# Patient Record
Sex: FEMALE
[De-identification: names, ages, dates, MRNs, and addresses within clinical notes are randomized per-mention and may not be internally consistent; named-entity substitution may affect disease eponyms.]

---

## 2017-06-18 ENCOUNTER — HOSPITAL ENCOUNTER (EMERGENCY)
Dept: HOSPITAL 68 - ERH | Age: 57
End: 2017-06-18
Payer: COMMERCIAL

## 2017-06-18 VITALS — BODY MASS INDEX: 23.56 KG/M2 | HEIGHT: 64 IN | WEIGHT: 138 LBS

## 2017-06-18 VITALS — SYSTOLIC BLOOD PRESSURE: 134 MMHG | DIASTOLIC BLOOD PRESSURE: 82 MMHG

## 2017-06-18 DIAGNOSIS — H11.31: Primary | ICD-10-CM

## 2017-06-18 NOTE — ED EYE COMPLAINT
History of Present Illness
 
General
Chief Complaint: Eye Problems
Stated Complaint: "PER PT RT EYE CUT"
Source: patient, old records
Exam Limitations: no limitations
 
Vital Signs & Intake/Output
Vital Signs & Intake/Output
 Vital Signs
 
 
Date Time Temp Pulse Resp B/P B/P Pulse O2 O2 Flow FiO2
 
     Mean Ox Delivery Rate 
 
06/18 0135 98.1 99 17 134/82  98 Room Air  
 
06/18 0026 98.4 102 16 137/84  98 Room Air Room Air 
 
 
Allergies
Uncoded Allergies:
METAL (Mild, RASH 09/21/16)
SOAPS AND LOTIONS (Mild, RASH 09/21/16)
 
Reconcile Medications
Cyclobenzaprine HCl 10 MG TABLET   1 TAB PO TID FIBRO/RA  (Reported)
Duloxetine HCl 30 MG CAPSULE.DR   1 CAP PO DAILY FIBROMYALGIA  (Reported)
Escitalopram Oxalate 20 MG TABLET   1 TAB PO DAILY FIBROMYALGIA  (Reported)
Fentanyl 1 EACH PATCH.TD72   1 PAT TOP Q3D FIBROMAYLGIA/RA  (Reported)
Gabapentin (Neurontin) 800 MG TABLET   1 TAB PO TID FIBROMAYLGIA/RA  (Reported)
Ibuprofen 600 MG TABLET   1 TAB PO TID PRN CHEST WALL PAIN
     with food
Infliximab (Remicade) 100 MG VIAL   (Unknown Dose) IV/IM Q 3-4 HRS PRN PRN RA  (
Reported)
Levothyroxine Sodium 75 MCG TABLET   1 TAB PO DAILY AC THYROID  (Reported)
Lisinopril 10 MG TABLET   1 TAB PO DAILY AS NEEDED B/P  (Reported)
Oxycodone HCl/Acetaminophen (Percocet 5-325 MG Tablet) 5 MG-325 MG TABLET   1-2 
TAB PO Q6P PRN PAIN
Prednisone 10 MG TABLET   2 TAB PO BID RA  (Reported)
TRAMADOL HCL (Tramadol Hydrochloride) 50 MG TABLET   1 TAB PO 4 TIMES/DAY PRN 
PAIN  (Reported)
 
Triage Note:
55yo FEMALE TO TRIAGE W/CO R EYE REDNESS.
 STATES SHE "WAS WORKING IN THE [place]ARD[place] AND MAY
 HAVE BEEN SCRATCHED BY A BRANCH OR SOMETHING"
Triage Nurses Notes Reviewed? yes
Onset: yesterday
Duration: day(s):, constant, continues in ED
Timing: recent history
Injury Environment: neighbor's
Severity: mild
No Modifying Factors: none
Right Eye Associated Symptoms: foreign body sensation
LMP (ages 10-50): post menopausal
Pregnant: No
Patient currently breastfeeds: No
HPI:
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
home and thinks she may have scratched her right eye.  She complains of redness 
and foreign body sensation.
She denies change in vision fever chills nausea vomiting diarrhea abdominal pain
chest pain shortness breath headache dysuria rash.
 
Past History
 
Travel History
Traveled to [place] past 21 day No
 
Medical History
Any Pertinent Medical History? see below for history
Neurological: NONE
EENT: NONE
Cardiovascular: hypertension
Respiratory: NONE
Gastrointestinal: NONE
Hepatic: NONE
Renal: NONE
Musculoskeletal: fibromyalgia, ARTHRITIS
Psychiatric: NONE
Endocrine: hypothyroidism
Blood Disorders: NONE
Cancer(s): NONE
GYN/Reproductive: NONE
 
Surgical History
Surgical History: non-contributory
 
Psychosocial History
What is your primary language English
Tobacco Use: Quit >30 days ago
 
Family History
Hx Contributory? No
 
Review of Systems
 
Review of Systems
Constitutional:
Reports: no symptoms. 
Eyes:
Reports: see HPI, foreign body sensation. 
Ear:
Reports: no symptoms. 
Nose:
Reports: no symptoms. 
Mouth:
Reports: no symptoms. 
Throat:
Reports: no symptoms. 
Respiratory:
Reports: no symptoms. 
Cardiovascular:
Reports: no symptoms. 
GI:
Reports: no symptoms. 
Genitourinary:
Reports: no symptoms. 
Musculoskeletal:
Reports: no symptoms. 
Skin:
Reports: no symptoms. 
Neurological/Psychological:
Reports: no symptoms. 
Hematologic/Endocrine:
Reports: no symptoms. 
Immunologic/Allergic:
Reports: no symptoms. 
All Other Systems: Reviewed and Negative
 
Physical Exam
General Appearance: well developed/nourished, mild distress
General Inspection: normal inspection
Eyelid: normal inspection
Conjunctiva/Sclera: normal inspection
Cornea: normal inspection
EOM: intact
Pupil: normal accommodation, normal pupil, PERRL
Anterior Chamber: normal inspection
General Inspection: normal inspection
Eyelid: normal inspection, everted for exam
Conjunctiva/Sclera: subconjunctival hemorrhag
Cornea: normal inspection, examined w/fluorescein
EOM: intact
Pupil: normal accommodation, normal pupil, PERRL
Anterior Chamber: normal inspection
 
Physical Exam
Head: atraumatic
Ears:
Bilateral: canal normal, Tympanic normal. 
Nose: normal inspection
Mouth/Throat: normal mouth inspection
Neck: normal inspection, supple
Cardiovascular/Respiratory: normal breath sounds, regular rate/rhythm
Neurologic/Psych: awake, alert, oriented x 3, normal mood/affect
Skin: intact, normal color, warm/dry
 
Progress
Differential Diagnosis: corneal abrasion, corneal foreign body, conjunctivitis
Plan of Care:
ophthalmology follow up
 
Departure
 
Departure
Time of Disposition: 0131
Disposition: HOME OR SELF CARE
Condition: Stable
Clinical Impression
Primary Impression: Subconjunctival hemorrhage of right eye
Referrals:
AMANDA SALDIVAR,ELIOT MONTOYA (PCP/Family)
 
CORAL SALDIVAR,LIBERTAD BOO
Call for ophthalmology follow up
 
Departure Forms:
Customer Survey
General Discharge Information

## 2018-05-06 ENCOUNTER — HOSPITAL ENCOUNTER (EMERGENCY)
Dept: HOSPITAL 68 - ERH | Age: 58
End: 2018-05-06
Payer: COMMERCIAL

## 2018-05-06 VITALS — HEIGHT: 64 IN | BODY MASS INDEX: 23.9 KG/M2 | WEIGHT: 140 LBS

## 2018-05-06 VITALS — SYSTOLIC BLOOD PRESSURE: 153 MMHG | DIASTOLIC BLOOD PRESSURE: 95 MMHG

## 2018-05-06 DIAGNOSIS — M54.5: Primary | ICD-10-CM

## 2018-05-06 NOTE — ED NECK/BACK PAIN COMPLAINT
History of Present Illness
 
General
Chief Complaint: Low Back Pain/Injury
Stated Complaint: "BACK PAIN, CANT WALK"
Source: patient
Exam Limitations: no limitations
 
Vital Signs & Intake/Output
Vital Signs & Intake/Output
 Vital Signs
 
 
Date Time Temp Pulse Resp B/P B/P Pulse O2 O2 Flow FiO2
 
     Mean Ox Delivery Rate 
 
05/06 0135 96.1 103 16 153/95  95 Room Air Room Air 
 
 
 
Allergies
Uncoded Allergies:
METAL (Mild, RASH 09/21/16)
SOAPS AND LOTIONS (Mild, RASH 09/21/16)
 
Reconcile Medications
Cyclobenzaprine HCl 5 MG TABLET   1 TAB PO TIDPRN muscle spasm  (Reported)
Docusate Sodium (Colace) 100 MG CAPSULE   1 CAP PO BID CONSTIPATION
Duloxetine HCl 30 MG CAPSULE.DR   1 CAP PO DAILY FIBROMYALGIA  (Reported)
Escitalopram Oxalate 20 MG TABLET   1 TAB PO DAILY FIBROMYALGIA  (Reported)
Fentanyl 1 EACH PATCH.TD72   1 PAT TOP Q3D FIBROMAYLGIA/RA  (Reported)
Gabapentin (Neurontin) 800 MG TABLET   1 TAB PO TID FIBROMAYLGIA/RA  (Reported)
Infliximab (Remicade) 100 MG VIAL   (Unknown Dose) IV/IM Q 3-4 HRS PRN PRN RA  (
Reported)
Levothyroxine Sodium (Levoxyl) 75 MCG TABLET   1 TAB PO DAILY AC THYROID  (
Reported)
Lisinopril 10 MG TABLET   1 TAB PO DAILY AS NEEDED B/P  (Reported)
Omeprazole 20 MG CAPSULE.DR   1 CAP PO DAILY hiatal hernia  (Reported)
Oxycodone HCl/Acetaminophen (Percocet 5-325 MG Tablet) 5 MG-325 MG TABLET   1-2 
TAB PO Q4-6 PRN PAIN
Prednisone 10 MG TABLET   2 TAB PO BID RA  (Reported)
Tramadol HCl 50 MG TABLET   1 TAB PO 4 TIMES/DAY PRN PAIN  (Reported)
Tramadol HCl (Ultram) 50 MG TABLET   1-2 TAB PO TID PRN PAIN
     TWENTY...[id number]
 
Triage Note:
58YO FEMALE TO TRIAGE W/CO LOW BACK PAIN SINCE
 FRI. DENIES ANY FALL OR TRAUMA.  STATES PAIN
 WORSENS W/SITTING AND STANDING. STATES SHE TOOK
 TORADOL AND GOT MILD RELIEF.
Triage Nurses Notes Reviewed? yes
Onset: Gradual
Duration: day(s):
Timing: recent history
Quality/Severity: moderate
Location: lumbar spine
Radiation: upper legs
Context: "IT felt worse friday morning but got better with tramadol
Associated Symptoms: muscle spasm
HPI:
58 yo woman
presents with 2 days of lower lumbar back pain w/ muscle spasm and mild numbness
in left anterior thigh, without weakness.
 
No bowel or bladder issues.  She took tramadol x 3 tabs with improvement in her 
pain.  "I was crying in pain but the tramadol helped a lot."
 
She is otherwise well. 
 
Past History
 
Travel History
Traveled to [place] past 21 day No
 
Medical History
Any Pertinent Medical History? see below for history
Neurological: NONE
EENT: NONE
Cardiovascular: hypertension
Respiratory: NONE
Gastrointestinal: hiatal hernia
Hepatic: NONE
Renal: NONE
Musculoskeletal: fibromyalgia, RA
Psychiatric: NONE
Endocrine: hypothyroidism
Blood Disorders: anemia
Cancer(s): NONE
GYN/Reproductive: ENDOMETRIOSIS
History of MRSA: No
History of VRE: No
History of CDIFF: No
 
Surgical History
Surgical History: laparoscopic lysis adhesions
 
Psychosocial History
What is your primary language English
Tobacco Use: Never used
 
Family History
Hx Contributory? No
 
Review of Systems
 
Review of Systems
Constitutional:
Reports: no symptoms. 
Eyes:
Reports: no symptoms. 
Ears, Nose, Throat, Mouth:
Reports: no symptoms. 
Respiratory:
Reports: no symptoms. 
Cardiovascular:
Reports: no symptoms. 
Gastrointestinal/Abdominal:
Reports: no symptoms. 
Musculoskeletal:
Reports: no symptoms. 
Skin:
Reports: no symptoms. 
Neurological/Psychological:
Reports: no symptoms. 
All Other Systems: Reviewed and Negative
 
Physical Exam
 
Physical Exam
General Appearance: well developed/nourished, mild distress
Head: atraumatic
Eyes:
Bilateral: normal appearance. 
Ears, Nose, Throat, Mouth: hearing grossly normal, moist mucous membrane
Neck: normal inspection, supple, full range of motion
Respiratory: normal breath sounds, chest non-tender, no respiratory distress, 
quiet respiration, lungs clear
Cardiovascular: regular rate/rhythm
Gastrointestinal: normal bowel sounds, soft, non-tender
Back: normal inspection, muscle spasm, no vertebral tenderness
Extremities: non-tender, normal range of motion, no ligament instability
Straight Leg Raising:
Right: Negative.  Left: Negative. 
Comments:
slight decrease in sensation in left anterior thigh
 
strength, dtr's intact in bilateral lower extremities. 
 
Core Measures
CVA/TIA Diagnosis: No
 
Progress
Differential Diagnosis: musculoskeletal back pain, radiculopathy vs other. 
Plan of Care:
 Current Medications
 
 
  Sig/Liz Start time  Last
 
Medication Dose  Stop Time Status Admin
 
Ketorolac  60 MG ONCE ONE 05/06 0215 UNVr 05/06
 
Tromethamine   05/06 0216  0206
 
(Toradol)     
 
 
 
 
Departure
 
Departure
Disposition: HOME OR SELF CARE
Condition: Stable
Clinical Impression
Primary Impression: Back pain
Referrals:
Deanna SALDIVAR,Aroldo MONTOYA (PCP/Family)
 
Departure Forms:
Customer Survey
General Discharge Information
Prescriptions:
Current Visit Scripts
Tramadol HCl (Ultram) 1-2 TAB PO TID PRN PAIN 
     #20 TAB 
     TWENTY...[id number]
 
 
Comments
pt given toradol IM w/ rx for tramadol... pt has appointment with pain 
management this monday.

## 2018-07-08 ENCOUNTER — HOSPITAL ENCOUNTER (EMERGENCY)
Dept: HOSPITAL 68 - ERH | Age: 58
End: 2018-07-08
Payer: COMMERCIAL

## 2018-07-08 VITALS — WEIGHT: 138 LBS | HEIGHT: 64 IN | BODY MASS INDEX: 23.56 KG/M2

## 2018-07-08 VITALS — SYSTOLIC BLOOD PRESSURE: 146 MMHG | DIASTOLIC BLOOD PRESSURE: 85 MMHG

## 2018-07-08 DIAGNOSIS — M54.5: ICD-10-CM

## 2018-07-08 DIAGNOSIS — R20.2: Primary | ICD-10-CM

## 2018-07-08 NOTE — ED NECK/BACK PAIN COMPLAINT
History of Present Illness
 
General
Chief Complaint: Low Back Pain/Injury
Stated Complaint: BIBA BACK PAIN
Source: patient, old records
Exam Limitations: no limitations
 
Vital Signs & Intake/Output
Vital Signs & Intake/Output
 Vital Signs
 
 
Date Time Temp Pulse Resp B/P B/P Pulse O2 O2 Flow FiO2
 
     Mean Ox Delivery Rate 
 
07/08 1124 98.2 114 16 146/85  98 Room Air  
 
 
 
Allergies
Uncoded Allergies:
METAL (Mild, RASH 09/21/16)
SOAPS AND LOTIONS (Mild, RASH 09/21/16)
 
Triage Note:
PT BIBA C/C LOW BACK PAIN UPON WAKENING THIS AM.
HX DJD, SEES PAIN MANAGEMENT SPECIALIST IN
[place]. PT USES FENTANYL PATCH, NAPROXEN, AND
GABAPENTIN FOR SAME. MEDICATED WITH 100 MG
FENTANYL IV BY PARAMEDIC EN ROUTE. UPON ARRIVAL
PAIN 6/10. PT STATES USED LIDODERM PATCH WITH
RELIEF PREVIOUSLY. DENIES RECENT INJURY OR TRAUMA
TO EXACERBATE CHRONIC PAIN.
Triage Nurses Notes Reviewed? yes
Onset: Gradual
Duration: week(s):
Timing: recent history
Location: lumbar spine
Radiation: upper legs
HPI:
57-year-old female with history of chronic back pain, fibromyalgia brought in by
ambulance complaining of low back pain worsening this morning.  Patient states 
that she saw her specialist this week, they prescribed her tramadol and that no 
patch however her pain worsened this morning.  Patient was medicated with IV 
fentanyl in route to hospital.  Patient reports back pain radiates from left low
back down left leg with associated numbness of left thigh.  There is no trauma 
or inciting that prior to onset of pain.  Patient denies saddle anesthesia, 
urinary or bowel incontinence, fevers, chills, vomiting.
(Shaila DIAMOND,Doris Cox)
Reconcile Medications
Cyclobenzaprine HCl 5 MG TABLET   1 TAB PO TIDPRN muscle spasm  (Reported)
Docusate Sodium (Colace) 100 MG CAPSULE   1 CAP PO BID CONSTIPATION
Duloxetine HCl 30 MG CAPSULE.DR   1 CAP PO DAILY FIBROMYALGIA  (Reported)
Escitalopram Oxalate 20 MG TABLET   1 TAB PO DAILY FIBROMYALGIA  (Reported)
Fentanyl 1 EACH PATCH.TD72   1 PAT TOP Q3D FIBROMAYLGIA/RA  (Reported)
Gabapentin (Neurontin) 800 MG TABLET   1 TAB PO TID FIBROMAYLGIA/RA  (Reported)
Infliximab (Remicade) 100 MG VIAL   (Unknown Dose) IV/IM Q 3-4 HRS PRN PRN RA  (
Reported)
Levothyroxine Sodium (Levoxyl) 75 MCG TABLET   1 TAB PO DAILY AC THYROID  (
Reported)
Lidocaine (Lidoderm) 5 % ADH..PATCH   1 PAT TOP DAILY PRN back pain
     may wear up to 12 hours
Lidocaine (Lidoderm) 5 % ADH..PATCH   1 PAT TOP DAILY PRN pain
     may wear up to 12 hours
Lisinopril 10 MG TABLET   1 TAB PO DAILY AS NEEDED B/P  (Reported)
Meloxicam (Mobic) 15 MG TABLET   1 TAB PO DAILY PRN pain
Methylprednisolone. (Medrol) 4 MG TAB.DS.PK   1 DP PO AD radiculopathy
     6 on day 1 then reduce by one tablet daily until gone
Naproxen (Naprosyn) 500 MG TABLET   1 TAB PO BID PRN back pain
Omeprazole 20 MG CAPSULE.DR   1 CAP PO DAILY hiatal hernia  (Reported)
Oxycodone HCl/Acetaminophen (Percocet 5-325 MG Tablet) 5 MG-325 MG TABLET   1-2 
TAB PO Q4-6 PRN PAIN
Prednisone 10 MG TABLET   2 TAB PO BID RA  (Reported)
Tramadol HCl 50 MG TABLET   1 TAB PO 4 TIMES/DAY PRN PAIN  (Reported)
Tramadol HCl (Ultram) 50 MG TABLET   1-2 TAB PO TID PRN PAIN
     TWENTY...[id number]
 
(Kaelyn SALDIVAR,Marcial)
 
Past History
 
Travel History
Traveled to [place] past 21 day No
 
Medical History
Any Pertinent Medical History? see below for history
Neurological: NONE
EENT: NONE
Cardiovascular: hypertension
Respiratory: NONE
Gastrointestinal: hiatal hernia
Hepatic: NONE
Renal: NONE
Musculoskeletal: degen joint disease, fibromyalgia, RA
Psychiatric: NONE
Endocrine: hypothyroidism
Blood Disorders: anemia
Cancer(s): NONE
GYN/Reproductive: ENDOMETRIOSIS
History of MRSA: No
History of VRE: No
History of CDIFF: No
 
Surgical History
Surgical History: laparoscopic lysis adhesions
 
Psychosocial History
What is your primary language English
Tobacco Use: Never used
 
Family History
Hx Contributory? No
(Doris Perdue)
 
Review of Systems
 
Review of Systems
Constitutional:
Reports: no symptoms. 
Eyes:
Reports: no symptoms. 
Ears, Nose, Throat, Mouth:
Reports: no symptoms. 
Respiratory:
Reports: no symptoms. 
Cardiovascular:
Reports: no symptoms. 
Gastrointestinal/Abdominal:
Reports: no symptoms. 
Musculoskeletal:
Reports: see HPI. 
Skin:
Reports: no symptoms. 
Neurological/Psychological:
Reports: see HPI. 
All Other Systems: Reviewed and Negative
(Doris Perdue)
 
Physical Exam
 
Physical Exam
General Appearance: well developed/nourished, no apparent distress, alert, awake
Head: atraumatic, normal appearance
Eyes:
Bilateral: normal appearance. 
Ears, Nose, Throat, Mouth: hearing grossly normal
Neck: normal inspection, supple, full range of motion
Respiratory: no respiratory distress
Peripheral Pulses:
2+ dorsalis pedis (R), 2+ dorsalis pedis (L)
Back: Lumbar tenderness without deformity
Extremities: left buttocks tenderness, full range of motion
Neurologic/Psych: no motor/sensory deficits, awake, alert, oriented x 3, normal 
gait, strength 5/5 equal bilateral lower extremities
Skin: intact, normal color, warm/dry
 
Core Measures
CVA/TIA Diagnosis: No
(Doris Perdue)
 
Progress
Differential Diagnosis: cauda equina syn, herniated disc, myofascial strain, 
sciatica, spinal cord inj, T/L spine injury, spinal abscess
Plan of Care:
 Current Medications
 
 
  Sig/Liz Start time  Last
 
Medication Dose  Stop Time Status Admin
 
Lidocaine 1 PAT ONCE ONE 07/08 1215 UNVr 
 
(Lidoderm)   07/08 1216  
 
 
The patient has no urinary/bowel incontinence, no saddle anesthesia, the 
diagnosis of cauda equina syndrome is unlikely in this patient.  She has no 
fevers, appears nontoxic appearing, listless suspicion for abscess.  Patient's 
pain has been present for over one year, pain is in similar location as previous
episodes of her exacerbated back pain.  Patient is ambulatory here in the 
emergency department.  According to CT , patient has outstanding prescription
for fentanyl patch.  She is also a patient of pain management, it is 
inappropriate for me to prescribe narcotics for this patient.  I offered anti-
inflammatory medication and have her she declines.  Patient does request 
lidocaine patches which are scrubbed for her.  Patient will follow-up with her 
pain management specialist ASAP.
(Doris Perdue)
 
Departure
 
Departure
Disposition: HOME OR SELF CARE
Condition: Stable
Clinical Impression
Primary Impression: Back pain
Qualifiers:  Back pain location: low back pain Chronicity: chronic Back pain 
laterality: left Sciatica presence: with sciatica Sciatica laterality: sciatica 
of left side Qualified Codes: M54.42 - Lumbago with sciatica, left side; G89.29 
- Other chronic pain
Secondary Impressions: Paresthesias
Referrals:
Deanna SALDIVARAroldo (PCP/Family)
 
Additional Instructions:
Continue medications as prescribed by your orthopedic specialist. Follow up with
your specialist this week. Use lidocaine patient as prescribed for pain. Return 
with worsening symptoms or concerns.
 
 
Please note that there might be incidental findings in your evaluation that are 
unrelated to the current emergency department visit.  Please notify your primary
care doctor about this emergency department visit in order to obtain and review 
all of the testing performed so that these incidental findings can be monitored 
as needed.
 
If you had an x-ray performed, please understand that some fractures may not be 
seen on the initial set of x-rays.  If your symptoms persist you might need a 
repeat set of x-rays to check for such a fracture.
 
If you had a laceration evaluated, please understand that foreign bodies such as
glass or wood may not be visible to the naked eye or on plain x-rays.  If the 
wound becomes red, swollen, increasingly more painful or if there is any 
drainage from the wound, please have it reevaluated by a physician for the 
possibility of a retained foreign body.
 
If you're unable to follow up as outlined in the discharge instructions please 
return to the emergency department.
 
Thank you for choosing the [facility] Emergency Department for your care.
It was a pleasure to serve you today.
Departure Forms:
Customer Survey
General Discharge Information
(Shaila DIAMOND,Doris Cox)
 
Departure
Prescriptions:
Current Visit Scripts
Lidocaine (Lidoderm) 1 PAT TOP DAILY PRN pain 
     #10 PAT 
     may wear up to 12 hours
 
Meloxicam (Mobic) 1 TAB PO DAILY PRN pain 
     #15 TAB 
 
 
 
PA/NP Co-Sign Statement
Statement:
ED Attending supervision documentation-
 
 I saw and evaluated the patient. I have also reviewed all the pertinent lab 
results and diagnostic results. I agree with the findings and the plan of care 
as documented in the PA's/NP's documentation. 
 
x I have reviewed the ED Record and agree with the PA's/NP's documentation.
 
[] Additions or exceptions (if any) to the PAs/NP's note and plan are 
summarized below:
[]
 
(Kaelyn SALDIVAR,Marcial)

## 2018-07-15 ENCOUNTER — HOSPITAL ENCOUNTER (INPATIENT)
Dept: HOSPITAL 68 - ERH | Age: 58
LOS: 2 days | DRG: 605 | End: 2018-07-17
Attending: INTERNAL MEDICINE | Admitting: INTERNAL MEDICINE
Payer: COMMERCIAL

## 2018-07-15 VITALS — SYSTOLIC BLOOD PRESSURE: 168 MMHG | DIASTOLIC BLOOD PRESSURE: 90 MMHG

## 2018-07-15 VITALS — WEIGHT: 142.44 LBS | BODY MASS INDEX: 24.32 KG/M2 | HEIGHT: 64 IN

## 2018-07-15 DIAGNOSIS — M06.9: ICD-10-CM

## 2018-07-15 DIAGNOSIS — S51.852A: Primary | ICD-10-CM

## 2018-07-15 DIAGNOSIS — D47.3: ICD-10-CM

## 2018-07-15 DIAGNOSIS — I10: ICD-10-CM

## 2018-07-15 DIAGNOSIS — R73.9: ICD-10-CM

## 2018-07-15 DIAGNOSIS — E03.9: ICD-10-CM
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
## 2018-07-15 DIAGNOSIS — D64.9: ICD-10-CM

## 2018-07-15 DIAGNOSIS — M79.7: ICD-10-CM

## 2018-07-15 DIAGNOSIS — T38.0X5A: ICD-10-CM

## 2018-07-15 DIAGNOSIS — M54.9: ICD-10-CM

## 2018-07-15 DIAGNOSIS — Z79.52: ICD-10-CM

## 2018-07-15 DIAGNOSIS — L03.112: ICD-10-CM

## 2018-07-15 LAB
ABSOLUTE GRANULOCYTE CT: 8.2 /CUMM (ref 1.4–6.5)
BASOPHILS # BLD: 0 /CUMM (ref 0–0.2)
BASOPHILS NFR BLD: 0.4 % (ref 0–2)
EOSINOPHIL # BLD: 0 /CUMM (ref 0–0.7)
EOSINOPHIL NFR BLD: 0.1 % (ref 0–5)
ERYTHROCYTE [DISTWIDTH] IN BLOOD BY AUTOMATED COUNT: 18.1 % (ref 11.5–14.5)
GRANULOCYTES NFR BLD: 86.2 % (ref 42.2–75.2)
HCT VFR BLD CALC: 29.1 % (ref 37–47)
LYMPHOCYTES # BLD: 1 /CUMM (ref 1.2–3.4)
MCH RBC QN AUTO: 25.5 PG (ref 27–31)
MCHC RBC AUTO-ENTMCNC: 31.3 G/DL (ref 33–37)
MCV RBC AUTO: 81.5 FL (ref 81–99)
MONOCYTES # BLD: 0.3 /CUMM (ref 0.1–0.6)
PLATELET # BLD: 490 /CUMM (ref 130–400)
PMV BLD AUTO: 8.1 FL (ref 7.4–10.4)
RED BLOOD CELL CT: 3.57 /CUMM (ref 4.2–5.4)
WBC # BLD AUTO: 9.5 /CUMM (ref 4.8–10.8)

## 2018-07-15 PROCEDURE — G0480 DRUG TEST DEF 1-7 CLASSES: HCPCS

## 2018-07-15 PROCEDURE — 2NBSP: CPT

## 2018-07-15 NOTE — CT SCAN REPORT
EXAMINATION:
CT UPPER EXTREMITY WITH CONTRAST, LEFT
 
CLINICAL INFORMATION:
Cat bite with left forearm and wrist swelling and pain
 
COMPARISON:
None
 
TECHNIQUE:
CT of the left distal forearm is performed following intravenous
administration of 95 mL Optiray 320 iodinated contrast
 
DLP:
353 mGy-cm
 
FINDINGS:
There is diffuse subcutaneous edema along the volar/ulnar aspect of the
forearm without a focal fluid collection to indicate an abscess. No
radiopaque foreign body. No underlying osseous abnormality.  Moderate-severe
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
 
IMPRESSION:
Diffuse subcutaneous edema along the volar/ulnar aspect of the left forearm
which may represent cellulitis. No abscess. No underlying osseous abnormality
or radiopaque foreign body.

## 2018-07-15 NOTE — HISTORY & PHYSICAL
JuliusDrew 07/15/18 2147:
General Information and HPI
MD Statement:
I have seen and personally examined LUISA THOMPSON and documented this H&P.
 
The patient is a 57 year old F who presented with a patient stated chief 
complaint of [cat bite and arm swelling].
 
Source of Information: patient
Exam Limitations: no limitations
History of Present Illness:
Patient is a 58 yo F with a PMH significant for hypertension, anemia, chronic 
back pain, fibromyalgia, degenerative joint disease, rheumatoid arthritis, 
hypothyroidism, endometriosis, hiatal hernia presents to ED with cat bites on 
her left forearm and scratches on her right arm leaving considerable swelling 
and cellulitis.
 
The incident occurred at around 2PM yesterday. She has encountered the cat 
previously on multiple occassions, but this time the cat attacked her, biting 
and scratching in the process of getting away. The cat left a bite on her left 
index finger, left forearm, along with scratches up both the left and right 
arms. There was significant bleeding from the left index finger. The patient 
instantly controlled the bleeding and when she got home she cleaned the bite 
wound with alcohol and hydrogen peroxide, then covered the wound with Neosporin 
and applied a Band-Aid. The next morning the patient woke up she noticed that 
her left arm had increased swelling and redness than the day before. She denies 
any discharge from the wound. 
 
Per patient today there has been a considerable increase in the amount of 
swelling and a feeling of tightness in her left arm due to the swelling. She 
reports no real pain, no loss of motor function, no numbeness, no parathesias, 
no pruritis in either hand, no fever, no diaphoresis, no chills, no nausea, no 
vomiting, no diarrhea. Patient is unsure if the cat has been vaccinated. She did
have a headache which was resolved with Toradol at the hospital. She believes 
her lack of pain maybe due to the amount of pain medications she takes.
 
Allergies/Medications
Allergies:
Uncoded Allergies:
METAL (Mild, RASH 09/21/16)
SOAPS AND LOTIONS (Mild, RASH 09/21/16)
 
Home Med list
Cyclobenzaprine HCl (Unknown Strength) TABLET   (Unknown Dose) PO TID MUSCLE 
SPASMS  (Reported)
Duloxetine HCl 20 MG CAPSULE.DR   1 CAP PO DAILY FIBROMYALGIA  (Reported)
Etanercept (Enbrel) 50 MG/ML (0.98 ML) PEN.INJCTR   50 MG SC QFRI RA  (Reported)
Fentanyl (Duragesic) 25 MCG/HOUR PATCH.TD72   1 PAT TOP Q3D FIBROMYALGIA/RA  (
Reported)
Gabapentin (Neurontin) 800 MG TABLET   1 TAB PO TID FIBROMAYLGIA/RA  (Reported)
Levothyroxine Sodium 88 MCG TABLET   1 TAB PO DAILY THYROID  (Reported)
Meloxicam (Mobic) 15 MG TABLET   1 TAB PO DAILY PRN pain
Prednisone 20 MG TABLET   1 TAB PO DAILY STEROID  (Reported)
 
 
Past History
 
Travel History
Traveled to [place] past 21 day No
 
Medical History
Neurological: NONE
EENT: NONE
Cardiovascular: hypertension
Respiratory: NONE
Gastrointestinal: hiatal hernia
Hepatic: NONE
Renal: NONE
Musculoskeletal: degen joint disease, fibromyalgia, RA
Psychiatric: NONE
Endocrine: hypothyroidism
Blood Disorders: anemia
Cancer(s): NONE
GYN/Reproductive: ENDOMETRIOSIS
History of MRSA: No
History of VRE: No
History of CDIFF: No
Tetanus Vaccine: 07/15/18
 
Surgical History
Surgical History: laparoscopic lysis adhesions
 
Past Family/Social History
 
Family History
Relations & Conditions if any
Relation not specified for:
  *No pertinent family history
 
 
Exam & Diagnostic Data
Last 24 Hrs of Vital Signs/I&O
 Vital Signs
 
 
Date Time Temp Pulse Resp B/P B/P Pulse O2 O2 Flow FiO2
 
     Mean Ox Delivery Rate 
 
07/15 2216 97.8 100 18 168/90  99 Room Air  
 
07/15 2142 98.7 92 20 148/84  95 Room Air  
 
07/15 1919  96       
 
07/15 1746 98.3 104 18 154/96  98 Room Air  
 
 
 Intake & Output
 
 
 07/16 0800 07/16 0000 07/15 1600
 
Intake Total  250 
 
Output Total   
 
Balance  250 
 
    
 
Intake, Oral  250 
 
Patient  142 lb 
 
Weight   
 
Weight  Bed scale 
 
Measurement   
 
Method   
 
 
 
 
Physical Exam
General Appearance Alert, Oriented X3, Cooperative, No Acute Distress
Skin Temp/Moisture Exam: Warm/Dry
Sepsis Skin Exam (color): Normal for Ethnicity
HEENT Atraumatic, PERRLA, EOMI
Neck Supple, No LAD
Cardiovascular Regular Rate, Normal S1, Normal S2
Lungs Clear to Auscultation
Abdomen Soft, No Tenderness
Neurological Normal Speech, Strength at 5/5 X4 Ext, Sensation Intact
Extremities Normal Pulses
Last 24 Hrs of Labs/Jam:
 Laboratory Tests
 
07/15/18 2046:
Lactic Acid Cancelled
 
07/15/18 1740:
Anion Gap 10, Estimated GFR > 60, BUN/Creatinine Ratio 32.9  H, Glucose 335  H, 
Lactic Acid 1.9, Calcium 9.8, Iron 28  L, TIBC 427, Ferritin 9.8  L, Total 
Bilirubin 0.3, AST 21, ALT 30, Alkaline Phosphatase 92, Total Protein 6.8, 
Albumin 3.9, Globulin 2.9, Albumin/Globulin Ratio 1.3, Vitamin B12 602, Folate 
12.3, CBC w Diff NO MAN DIFF REQ, RBC 3.57  L, MCV 81.5, MCH 25.5  L, MCHC 31.3 
L, RDW 18.1  H, MPV 8.1, Gran % 86.2  H, Lymphocytes % 10.1  L, Monocytes % 3.2,
Eosinophils % 0.1, Basophils % 0.4, Absolute Granulocytes 8.2  H, Absolute 
Lymphocytes 1.0  L, Absolute Monocytes 0.3, Absolute Eosinophils 0, Absolute 
Basophils 0, Serum Alcohol < 10.0
 Microbiology
07/15 1755  BLOOD: Blood Culture - RECD
07/15 1740  BLOOD: Blood Culture - RECD
 
 
 
Assessment/Plan
Assessment:
Vitals 98.3, , RR 18, /96 and O2 Sats 98% on Room Air 
Labs H&H 9.1/29.1, RDW 18.1, Plt count 490, Blood glucose 335, Normal Lactic 
acid and LFTs.
Blood Cx pending. 
 
Problem List: 
1. Cellulitis of Left Arm
2. Thrombocytosis (platelets 490, reactive vs infection)
3. Elevated Glucose (likely 2/2 to chronic steroids)
4. Anemia (chronic vs acute)
5. Chronic (RA, FM, HypoThy)
 
Received tetanus and Clindamycin in ER
 
- Admit patient to general medicine
- IV Unasyn (ampicillin + sulbactam)
- CBC
- F/U blood Cx
- Trend H and H
- Check A1c
- Novolg SSC
- Accucheck
- Anemia Panel (B12/Folate/Ferritin/TIBC/Iron)
- Steroids 20mg
- Levothyroxine
- Pain management
- DVT ppx
- Full code
 
As Ranked By This Provider
Problem List:
 1. Cat bite
 
 2. Cellulitis of left arm
 
 3. Paresthesias
 
 4. Back pain
 
 
Core Measures/Misc (9/17)
 
Acute Coronary Syndrome
ACS Diagnosis: No
 
Congestive Heart Failure
Congestive Heart Failure Diagnosis No
 
Cerebrovascular Accident
CVA/TIA Diagnosis: No
 
VTE (View Protocol)
VTE Risk Factors Age>40
No Mechanical VTE Prophylaxis d/t N/A MechProphylax Ordered
No VTE Pharm Prophylaxis d/t NA PharmProphylax ordered
 
Sepsis (View protocol)
Sepsis Present: No
If YES complete Sepsis Event Note If YES complete Sepsis Event Note
 
 
Madison Elizondo MD 07/15/18 2201:
Core Measures/Misc (9/17)
 
Sepsis (View protocol)
If YES complete Sepsis Event Note If YES complete Sepsis Event Note
 
Resident Review Statement
Other Findings:
Ms. Thompson is a 57-year-old lady with past medical history significant for 
hypertension, hypothyroidism, fibromyalgia, endometriosis and rheumatoid 
arthritis on etanercept and prednisone presents to the ER after a cat bite 
yesterday afternoon.
Per the patient, she was trying to pathway cath yesterday afternoon around 1-2 
PM, when all of a sudden the cat bit her on her left index finger left forearm. 
Per the patient, she has seen that Multiple times in the past history comes to 
very ER daily and it looks like home.  She had significant bleeding from the 
bite site on her index finger but stopped after she put the Band-Aid on.  Denies
any bleeding afterwards or any purulent discharge.  She noticed mild swelling 
and discoloration around the bite site last night but this swelling was worse 
when she woke up this morning with spread to her entire left hand and and also 
extending up to her elbow.  Reports some tightness because of the swelling but 
denies any pain which she thinks is because she has the fentanyl patch on and is
also taking gabapentin for her fibromyalgia and rheumatoid arthritis.  Denies 
any fever/chills, muscle weakness, numbness/tingling or loss of sensation in her
left upper extremity.  Reports a headache after she came to the hospital which 
got better with Toradol.
 
Vitals 98.3, , RR 18, /96 and O2 Sats 98% on Room Air 
Labs H&H 9.1/29.1, RDW 18.1, Plt count 490, Blood glucose 335, Normal Lactic 
acid and LFTs.
Blood Cx pending. 
 
CT of LUE with contrast showed Diffuse subcutaneous edema along the volar/ulnar 
aspect of the left forearm which may represent cellulitis. No abscess, 
underlying osseous abnormality or radiopaque foreign body was seen.
 
Problem List;
1. Cellulitis 2/2 Cat Bite
2. Chronic Anemia likely Microcytic with elevated RDW
3. Thrombocytosis; ?? Reactive to chronic anemia
4. Elevated Blood glucose without any Hx of DM, Steroid induced??
5. HX of RA, Fibromyalgia and hypothyroidism 
 
- Admit the Patient to gen Med Floor.
- Patient was given IV clindamycin in the ER, will continue.
- f/u blood Cx
- Recieved tetanus vaccine in the ER.
- Will give Rabies vaccine (no need of rabies Ig as she has previous hx of 
rabies immunization as a child), 2nd dose on day 3.
- Elevated blood glucose likely secondary to Prednisone use, will check A1c. 
- Hx of chronic anemia, will check serum iron, ferritin, TIBC,folate and B12 
levels.
- Pain managment.
- Continue home meds.
 
DVT Prophylaxis; ALPS and S/C Lovenox
Patient is full code.
 
Letty SALDIVARFort Pierre 07/16/18 0426:
General Information and HPI
MD Statement:
I have seen and personally examined LUISA THOMPSON and documented this H&P.
 
The patient is a 57 year old F who presented with a patient stated chief 
complaint of [cat bite].
 
 
Past History
 
Medical History
Cardiovascular: hypertension
Gastrointestinal: hiatal hernia
Musculoskeletal: degen joint disease, fibromyalgia, RA
Endocrine: hypothyroidism
Blood Disorders: anemia
 
Past Family/Social History
 
Psychosocial History
Smoking Status: Never Smoked
ETOH Use: denies use
Illicit Drug Use: denies illicit drug use
 
Employment History
Employment Employed
 
Review of Systems
 
Review of Systems
Constitutional:
Reports: see HPI. 
 
Exam & Diagnostic Data
Last 24 Hrs of Vital Signs/I&O
 Vital Signs
 
 
Date Time Temp Pulse Resp B/P B/P Pulse O2 O2 Flow FiO2
 
     Mean Ox Delivery Rate 
 
07/15 2216 97.8 100 18 168/90  99 Room Air  
 
07/15 2142 98.7 92 20 148/84  95 Room Air  
 
07/15 1919  96       
 
07/15 1746 98.3 104 18 154/96  98 Room Air  
 
 
 Intake & Output
 
 
 07/16 0800 07/16 0000 07/15 1600
 
Intake Total  250 
 
Output Total   
 
Balance  250 
 
    
 
Intake, Oral  250 
 
Patient  142 lb 
 
Weight   
 
Weight  Bed scale 
 
Measurement   
 
Method   
 
 
 
 
Physical Exam
General Appearance Alert, Oriented X3, Cooperative, No Acute Distress
Skin Temp/Moisture Exam: Warm/Dry
Sepsis Skin Exam (color): Normal for Ethnicity
HEENT Atraumatic, PERRLA, EOMI
Neck Supple, No JVD, No LAD
Cardiovascular Regular Rate, Normal S1, Normal S2, No Murmurs
Lungs Clear to Auscultation, Normal Air Movement
Abdomen Normal Bowel Sounds, Soft, No Tenderness, No Hepatospenomegaly
Neurological Normal Gait, Normal Speech
Extremities Normal Pulses
Last 24 Hrs of Labs/Jam:
 Laboratory Tests
 
07/15/18 2046:
Lactic Acid Cancelled
 
07/15/18 1740:
Anion Gap 10, Estimated GFR > 60, BUN/Creatinine Ratio 32.9  H, Glucose 335  H, 
Lactic Acid 1.9, Calcium 9.8, Iron 28  L, TIBC 427, Ferritin 9.8  L, Total 
Bilirubin 0.3, AST 21, ALT 30, Alkaline Phosphatase 92, Total Protein 6.8, 
Albumin 3.9, Globulin 2.9, Albumin/Globulin Ratio 1.3, Vitamin B12 602, Folate 
12.3, CBC w Diff NO MAN DIFF REQ, RBC 3.57  L, MCV 81.5, MCH 25.5  L, MCHC 31.3 
L, RDW 18.1  H, MPV 8.1, Gran % 86.2  H, Lymphocytes % 10.1  L, Monocytes % 3.2,
Eosinophils % 0.1, Basophils % 0.4, Absolute Granulocytes 8.2  H, Absolute 
Lymphocytes 1.0  L, Absolute Monocytes 0.3, Absolute Eosinophils 0, Absolute 
Basophils 0, Serum Alcohol < 10.0
 Microbiology
07/15 1755  BLOOD: Blood Culture - RECD
07/15 1740  BLOOD: Blood Culture - RECD
 
 
 
 
Core Measures/Misc (9/17)
 
Sepsis (View protocol)
If YES complete Sepsis Event Note If YES complete Sepsis Event Note
 
Attending MD Review Statement
 
Attending Statement
Attending MD Statement: examined this patient, discuss w/resident/PA/NP, agreed 
w/resident/PA/NP, reviewed EMR data (avail), amended to note
Attending Assessment/Plan:
This Patient is a 57 year old female with a significant past medical history of 
rheumatoid arthritis, fibromyalgia, HTN, anemia went to the ED for increased 
swelling and redness of the left arm after being bit by a stray cat yesterday. 
Patient states she approached the cat in the same calm manner as she has done in
the past but the cat scratched her in multiple locations on both arms and bit 
her left hand and left forearm. The patient states the cat punctured skin and 
caused her to bleed. Patient instantly controlled the bleeding, cleaned the bite
wound with alcohol and hydrogen peroxide, then covered the wound with neosporin 
and applied a bandaid. Patient states she woke up this morning and noticed that 
her left forearm had increased swelling and redness from the day before. She 
denies any discharge from the wound. She reports since being in the hospital, 
the swelling has continued to increase. She reports a tightening sensation 
localized to the area of swelling. Patient states she is familiar with the stray
cat because it lives in the neighborhood and she feeds it, however, she is 
unsure whether the cat has been vaccinated. Patient was given TDaP vaccine and 
antibiotics in the Emergency Department. Patient refused rabies vaccine in the 
ED, but after further education and concerns addressed, the patient agreed to 
Rabies vaccine administration. She was started on Unasyn for broad spectrum 
coverage.

## 2018-07-15 NOTE — ED ANIMAL BITE/WOUND CHECK
History of Present Illness
 
General
Chief Complaint: Animal/Insect Bite
Stated Complaint: "BIT BY CAT ON LT ARM"
Source: patient
Exam Limitations: no limitations
 
Vital Signs & Intake/Output
Vital Signs & Intake/Output
 Vital Signs
 
 
Date Time Temp Pulse Resp B/P B/P Pulse O2 O2 Flow FiO2
 
     Mean Ox Delivery Rate 
 
7 98.3 99 18 132/82  96 Room Air  
 
 1459 98.7 99 18 134/80  94 Room Air  
 
 0638 97.8 81 20 126/93  97 Room Air  
 
 
 ED Intake and Output
 
 
  0000  1200
 
Intake Total 770 160
 
Output Total  
 
Balance 770 160
 
   
 
Intake,  40
 
Intake, Oral 620 120
 
Number 0 
 
Bowel  
 
Movements  
 
 
 
Allergies
Uncoded Allergies:
METAL (Mild, RASH 16)
SOAPS AND LOTIONS (Mild, RASH 16)
 
Reconcile Medications
Cyclobenzaprine HCl (Unknown Strength) TABLET   (Unknown Dose) PO TID MUSCLE 
SPASMS  (Reported)
Duloxetine HCl 20 MG CAPSULE.DR   1 CAP PO DAILY FIBROMYALGIA  (Reported)
Etanercept (Enbrel) 50 MG/ML (0.98 ML) PEN.INJCTR   50 MG SC QFRI RA  (Reported)
Fentanyl (Duragesic) 25 MCG/HOUR PATCH.TD72   1 PAT TOP Q3D FIBROMYALGIA/RA  (
Reported)
Gabapentin (Neurontin) 800 MG TABLET   1 TAB PO TID FIBROMAYLGIA/RA  (Reported)
Levothyroxine Sodium 88 MCG TABLET   1 TAB PO DAILY THYROID  (Reported)
Meloxicam (Mobic) 15 MG TABLET   1 TAB PO DAILY PRN pain
Prednisone 20 MG TABLET   1 TAB PO DAILY STEROID  (Reported)
 
Triage Nurses Notes Reviewed? yes
Injury Environment: home
Is Injury an Animal Bite? Yes
Animal Type: cat
Context of Animal Attack: approached animal
Appearance of Animal: appeared ill
Severity of Attack: bitten, scratched
Severity: moderate
HPI:
Patient is a 57-year-old female with a past medical history of chronic back pain
, anemia, and hypertension who presents emergency room for concerns of 
approaching a neighborhood cat yesterday to pet the cat patient states that she 
is seen the cat on multiple occasions prior however yesterday the "off" and 
which she states that the cat subsequently bit her and scratched her to her left
forearm and since patient has been
(Martin Aguayo)
 
Past History
 
Travel History
Traveled to [place] past 21 day No
 
Medical History
Any Pertinent Medical History? see below for history
Neurological: NONE
EENT: NONE
Cardiovascular: hypertension
Respiratory: NONE
Gastrointestinal: hiatal hernia
Hepatic: NONE
Renal: NONE
Musculoskeletal: degen joint disease, fibromyalgia, RA
Psychiatric: NONE
Endocrine: hypothyroidism
Blood Disorders: anemia
Cancer(s): NONE
GYN/Reproductive: ENDOMETRIOSIS
History of MRSA: No
History of VRE: No
History of CDIFF: No
 
Surgical History
Surgical History: laparoscopic lysis adhesions
 
Psychosocial History
What is your primary language English
 
Family History
Hx Contributory? No
(Martin Aguayo)
 
Review of Systems
 
Review of Systems
Constitutional:
Reports: no symptoms. 
EENTM:
Reports: no symptoms. 
Respiratory:
Reports: no symptoms. 
Cardiovascular:
Reports: no symptoms. 
GI:
Reports: no symptoms. 
Genitourinary:
Reports: no symptoms. 
Musculoskeletal:
Reports: see HPI. 
Skin:
Reports: see HPI. 
Neurological/Psychological:
Reports: no symptoms. 
Hematologic/Endocrine:
Reports: no symptoms. 
Immunologic/Allergic:
Reports: no symptoms. 
All Other Systems: Reviewed and Negative
(Martin Aguayo)
 
Physical Exam
 
Physical Exam
General Appearance: no apparent distress, alert, comfortable
Head: atraumatic
Eyes:
Bilateral: normal appearance. 
Ears, Nose, Throat: hearing grossly normal
Respiratory: no respiratory distress
Peripheral Pulses:
2+ radial (L)
Extremities: evidence of injury
Neurologic/Psych: no motor/sensory deficits, awake, alert, oriented x 3, normal 
gait
 
Diagram
Body:
 
  1) Noted superficial minimal 3 mm non-gaping scratch with mild active bleeding
  2) Noted circumferential swelling ecchymosis point tenderness full active 
range of motion in the left wrist
No purulent discharge dermatomes intact
 
 
(Martin Aguayo)
 
Progress
Differential Diagnosis: abscess, cellulitis, joint infection, tenosysnovitis
Plan of Care:
 Orders
 
 
Procedure Date/time Status
 
Consistent Carbohydrate 1  D Active
 
Change service to  0940 Active
 
Lab Add-on Test   UNK Active
 
FingerStick- Glucose   UNK Active
 
PHARMACY COMMUNICATION FORM   UNK Active
 
TOTAL IRON BINDING CAPACITY 07/15 1740 Complete
 
GLYCOSYLATED HGB 07/15 1740 Complete
 
FOLIC ACID 07/15 1740 Complete
 
FERRITIN 07/15 1740 Complete
 
SERUM IRON 07/15 1740 Complete
 
ETHANOL 07/15 1740 Complete
 
VITAMIN B12 07/15 1740 Complete
 
 
 Current Medications
 
 
  Sig/Liz Start time  Last
 
Medication Dose  Stop Time Status Admin
 
Insulin Aspart 0 TIDAC  1700 AC 
 
(NovoLOG)     
 
Duloxetine HCl 20 MG DAILY  09 AC 
 
(Cymbalta)     08
 
Enoxaparin Sodium 40 MG DAILY  09 AC 
 
(Lovenox)     08
 
Ferrous Sulfate 325 MG  AC 
 
(Feosol)     225
 
Levothyroxine Sodium 0.088 MG DAILY 900 AC 
 
(Synthroid)     08
 
Prednisone 20 MG DAILY  09 AC 
 
     08
 
Ampicillin Sodium/ 1,500 MG Q6H  08 AC 
 
Sulbactam Sodium     2200
 
(Unasyn)     
 
Sodium Chloride 100 ML    
 
(Normal Saline 0.9%)     
 
Acetaminophen 1,000 MG Q8P PRN  0745 AC 
 
(Tylenol)     1408
 
Gabapentin 800 MG Q8 07/15 2316 AC 
 
(Neurontin)     2258
 
Cyclobenzaprine HCl 10 MG TID PRN 07/15 231 AC 
 
(Flexeril 10MG Tab)     0829
 
 
 Laboratory Tests
 
 
 
18 1600:
CBC w Diff NO MAN DIFF REQ, RBC 3.47  L, MCV 82.0, MCH 25.5  L, MCHC 31.1  L, 
RDW 19.0  H, MPV 8.8, Gran % 85.6  H, Lymphocytes % 11.0  L, Monocytes % 3.3, 
Eosinophils % 0.1, Basophils % 0, Absolute Granulocytes 8.4  H, Absolute 
Lymphocytes 1.1  L, Absolute Monocytes 0.3, Absolute Eosinophils 0, Absolute 
Basophils 0
Patient was offered rabies vaccines and medications however she declines I 
discussed with her the risks of this and which she was aware
 
Patient will be admitted for concerns of Bite and cellulitis to the left forearm
wrist and hand no concerns of sepsis at this time
 
 
Diagnostic Imaging:
Viewed by Me: CT Scan. 
Radiology Impression: acute abnormality
Comments:
PATIENT: LUISA THOMPSON  MEDICAL RECORD NO: [id number]
PRESENT AGE: 57  PATIENT ACCOUNT NO: [id number]
: 56  LOCATION: [facility]
ORDERING PHYSICIAN: Martin DIAMOND  
 
  SERVICE DATE: 07/15/
EXAM TYPE: CAT - CT UPPER EXT W IV CONTRAST
 
EXAMINATION:
CT UPPER EXTREMITY WITH CONTRAST, LEFT
 
CLINICAL INFORMATION:
Cat bite with left forearm and wrist swelling and pain
 
COMPARISON:
None
 
TECHNIQUE:
CT of the left distal forearm is performed following intravenous
administration of 95 mL Optiray 320 iodinated contrast
 
DLP:
353 mGy-cm
 
FINDINGS:
There is diffuse subcutaneous edema along the volar/ulnar aspect of the
forearm without a focal fluid collection to indicate an abscess. No
radiopaque foreign body. No underlying osseous abnormality.  Moderate-severe
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
 
IMPRESSION:
Diffuse subcutaneous edema along the volar/ulnar aspect of the left forearm
which may represent cellulitis. No abscess. No underlying osseous abnormality
or radiopaque foreign body.
 
 
DICTATED BY: Leo Malhotra MD 
DATE/TIME DICTATED:07/15/18 / 2001
:RAD.RUBY 
DATE/TIME TRANSCRIBED:07/15/18 / 2001
(Martin Aguayo)
 
Departure
 
Departure
Disposition: STILL A PATIENT
Condition: Stable
Clinical Impression
Primary Impression: Cellulitis of left arm
Secondary Impressions: Cat bite
Referrals:
Deanna SALDIVAR,Aroldo MONTOYA (PCP/Family)
 
Departure Forms:
Customer Survey
General Discharge Information
 
Admission Note
Spoke With:
Pankaj Saenz MD
Documentation of Exam:
Documentation of any treatments & extenuating circumstances including Concerns 
Regarding Discharge (functional status, medication knowledge or non-compliance, 
living conditions, etc.) that warrant an admission rather than observation: [
Patient requires IV antibiotics plastic surgery consultation repeat labs pain 
management]
Blood cultures pending
(Martin Aguayo)
 
PA/NP Co-Sign Statement
Statement:
ED Attending supervision documentation-
 
[X] I saw and evaluated the patient. I have also reviewed all the pertinent lab 
results and diagnostic results. I agree with the findings and the plan of care 
as documented in the PA's/NP's documentation. 
 
[X] I have reviewed the ED Record and agree with the PA's/NP's documentation.
 
[] Additions or exceptions (if any) to the PAs/NP's note and plan are 
summarized below:
[NEEDS ADMISSION FOR CAT BITE CELLULITIS, ID CONSULT, PT REFUSING RABIES 
PROFALAXIS.]
 
(Taylor SALDIVAR,Oz MONTOYA)

## 2018-07-16 VITALS — DIASTOLIC BLOOD PRESSURE: 80 MMHG | SYSTOLIC BLOOD PRESSURE: 134 MMHG

## 2018-07-16 VITALS — SYSTOLIC BLOOD PRESSURE: 132 MMHG | DIASTOLIC BLOOD PRESSURE: 82 MMHG

## 2018-07-16 VITALS — DIASTOLIC BLOOD PRESSURE: 93 MMHG | SYSTOLIC BLOOD PRESSURE: 126 MMHG

## 2018-07-16 LAB
ABSOLUTE GRANULOCYTE CT: 8.4 /CUMM (ref 1.4–6.5)
BASOPHILS # BLD: 0 /CUMM (ref 0–0.2)
BASOPHILS NFR BLD: 0 % (ref 0–2)
EOSINOPHIL # BLD: 0 /CUMM (ref 0–0.7)
EOSINOPHIL NFR BLD: 0.1 % (ref 0–5)
ERYTHROCYTE [DISTWIDTH] IN BLOOD BY AUTOMATED COUNT: 19 % (ref 11.5–14.5)
GRANULOCYTES NFR BLD: 85.6 % (ref 42.2–75.2)
HCT VFR BLD CALC: 28.5 % (ref 37–47)
LYMPHOCYTES # BLD: 1.1 /CUMM (ref 1.2–3.4)
MCH RBC QN AUTO: 25.5 PG (ref 27–31)
MCHC RBC AUTO-ENTMCNC: 31.1 G/DL (ref 33–37)
MCV RBC AUTO: 82 FL (ref 81–99)
MONOCYTES # BLD: 0.3 /CUMM (ref 0.1–0.6)
PLATELET # BLD: 496 /CUMM (ref 130–400)
PMV BLD AUTO: 8.8 FL (ref 7.4–10.4)
RED BLOOD CELL CT: 3.47 /CUMM (ref 4.2–5.4)
WBC # BLD AUTO: 9.8 /CUMM (ref 4.8–10.8)

## 2018-07-16 NOTE — PN- HOUSESTAFF
**See Addendum**
Subjective
Follow-up For:
Cat bite
 
Subjective:
Patient seen and examined at bedside, no acute events overnight, afebrile. 
Patient has no c/o. States she and her  feed a presumed abandoned cat, as
she went to pet her the cat bit her. Denies: fever, nightsweats, chills
 
Review of Systems
Constitutional:
Reports: see HPI. 
 
Objective
Last 24 Hrs of Vital Signs/I&O
 Vital Signs
 
 
Date Time Temp Pulse Resp B/P B/P Pulse O2 O2 Flow FiO2
 
     Mean Ox Delivery Rate 
 
 0638 97.8 81 20 126/93  97 Room Air  
 
07/15 2216 97.8 100 18 168/90  99 Room Air  
 
07/15 2142 98.7 92 20 148/84  95 Room Air  
 
07/15 1919  96       
 
07/15 1746 98.3 104 18 154/96  98 Room Air  
 
 
 Intake & Output
 
 
  1600  0800  0000
 
Intake Total  160 250
 
Output Total   
 
Balance  160 250
 
    
 
Intake, IV  40 
 
Intake, Oral  120 250
 
Patient   142 lb
 
Weight   
 
Weight   Bed scale
 
Measurement   
 
Method   
 
 
 
 
Physical Exam
General Appearance: Alert, Oriented X3, Cooperative
Skin: Left forearm ecchymoses, swelling and indurated skin. No discharge, warmth
appreciated. 
HEENT: Atraumatic, EOMI
Neck: Supple, No LAD
Cardiovascular: Regular Rate, Normal S1, Normal S2
Lungs: Clear to Auscultation, Normal Air Movement
Abdomen: Normal Bowel Sounds, Soft, No Tenderness
Neurological: Normal Speech, Strength at 5/5 X4 Ext, Sensation Intact
Current Medications:
 Current Medications
 
 
  Sig/Liz Start time  Last
 
Medication Dose Route Stop Time Status Admin
 
Acetaminophen 1,000 MG Q8P PRN  0745 AC 
 
  PO   1408
 
Acetaminophen 650 MG Q6PRN PRN 07/15 2200 DC 
 
  PO   
 
Ampicillin Sodium/ 1,500 MG Q6H  0800 AC 
 
Sulbactam Sodium  IV   1408
 
Sodium Chloride 100 ML    
 
Ampicillin Sodium/ 1,500 MG Q6  0109 DC 
 
Sulbactam Sodium  IV   0206
 
Sodium Chloride 100 ML    
 
Clindamycin 600 MG ONCE ONE 07/15 1800 DC 07/15
 
Dextrose/Water 50 ML IV 07/15 1829  1905
 
Cyclobenzaprine HCl 10 MG TID PRN 07/15 2315 AC 
 
  PO   0829
 
Duloxetine HCl 20 MG DAILY 900 AC 
 
  PO   0822
 
Enoxaparin Sodium 40 MG DAILY 900 AC 
 
  SC   0822
 
Ferrous Sulfate 325 MG  AC 
 
  PO   0824
 
Gabapentin 800 MG Q8 07/15 2316 AC 
 
  PO   1408
 
Ketorolac  30 MG ONCE ONE 07/15 193 DC 07/15
 
Tromethamine  IV 07/15 1931  1917
 
Ketorolac  0 .STK-MED ONE 07/15 1916 DC 
 
Tromethamine  .ROUTE   
 
Levothyroxine Sodium 0.088 MG DAILY 900 AC 
 
  PO   0822
 
Oxycodone/ 1 TAB Q6P PRN 07/15 2200 DC 
 
Acetaminophen  PO   
 
Prednisone 20 MG DAILY 900 AC 
 
  PO   08
 
Rabies Vaccine 1 SYR ONCE ONE 07/15 2345 DC 
 
  IM 07/15 2346  0035
 
Tetanus/Diphtheria  0.5 ML ONCE ONE 07/15 174 [place] 07/15
 
Toxoids Adsorbed  IM 07/15 1746  1912
 
 
 
 
Last 24 Hrs of Lab/Jam Results
Last 24 Hrs of Labs/Mics:
 Laboratory Tests
 
07/15/18 2046:
Lactic Acid Cancelled
 
07/15/18 1740:
Anion Gap 10, Estimated GFR > 60, BUN/Creatinine Ratio 32.9  H, Glucose 335  H, 
Hemoglobin A1c 7.6  H, Lactic Acid 1.9, Calcium 9.8, Iron 28  L, TIBC 427, 
Ferritin 9.8  L, Total Bilirubin 0.3, AST 21, ALT 30, Alkaline Phosphatase 92, 
Total Protein 6.8, Albumin 3.9, Globulin 2.9, Albumin/Globulin Ratio 1.3, 
Vitamin B12 602, Folate 12.3, CBC w Diff NO MAN DIFF REQ, RBC 3.57  L, MCV 81.5,
MCH 25.5  L, MCHC 31.3  L, RDW 18.1  H, MPV 8.1, Gran % 86.2  H, Lymphocytes % 
10.1  L, Monocytes % 3.2, Eosinophils % 0.1, Basophils % 0.4, Absolute 
Granulocytes 8.2  H, Absolute Lymphocytes 1.0  L, Absolute Monocytes 0.3, 
Absolute Eosinophils 0, Absolute Basophils 0, Serum Alcohol < 10.0
 Microbiology
07/15 1755  BLOOD: Blood Culture - RECD
07/15 1740  BLOOD: Blood Culture - RECD
 
 
 
Assessment/Plan
Assessment:
Patient is a 58 yo F with a PMH significant for hypertension, anemia, chronic 
back pain, fibromyalgia, degenerative joint disease, rheumatoid arthritis, 
hypothyroidism, endometriosis, hiatal hernia. She presented to ED on 7/15/18 
with a cat bite on her left forearm which lead to swelling and cellulitis of the
affected arm. 
 
 
Problem List: 
1. Cat Bite
2. Anemia
3. Fibromyalgia 
4. Rheumatoid Arthritis 
5. Hypothyroidism 
 
 
#Cat Bite: 
Plan: 
- Continue Unasyn, can swtich to PO during discharge planning
- f/u blood cultures 
- 2nd Rabies shot on 18
 
#Hypothyroidism
Plan: 
Continue Levothyroxine 0.088mg
 
#Anemia: H/H 9.1/29.1. Low Iron and Ferritin 
plan: 
Ferrous Sulfate 325mg 
 
#Rheumatoid Arthritis
Plan: 
- Continue Prednisone 20mg
 
#Fibromyalgia 
Plan: 
- Duloxetine 20mg 
- Gabapentin 800mg TID 
 
Problem List:
 1. Cat bite
 
Pain Ratin
Pain Location:
n/a
Pain Goal: Remain pain free
Pain Plan:
Tylenol, Gabapentin
Tomorrow's Labs & Rationales:
none

## 2018-07-17 VITALS — DIASTOLIC BLOOD PRESSURE: 89 MMHG | SYSTOLIC BLOOD PRESSURE: 142 MMHG

## 2018-07-17 VITALS — SYSTOLIC BLOOD PRESSURE: 144 MMHG | DIASTOLIC BLOOD PRESSURE: 90 MMHG

## 2018-07-17 NOTE — PN- HOUSESTAFF
**See Addendum**
Subjective
Follow-up For:
Cat bite
Subjective:
No acute events overnight, afebrile. Patient states she is feeling better, 
denies tingling, numbness, loss of  strenght, loss of sensation in left hand
and forearm. Patient states she feels ready to go home, she has a scheduled MRI 
tonight @ [facility] due to chronic back pain. Denies: fever, 
nightsweats and chills 
 
Review of Systems
Constitutional:
Reports: see HPI. 
 
Objective
Last 24 Hrs of Vital Signs/I&O
 Vital Signs
 
 
Date Time Temp Pulse Resp B/P B/P Pulse O2 O2 Flow FiO2
 
     Mean Ox Delivery Rate 
 
 0656 98.4 92 18 142/89  95   
 
 2227 98.3 99 18 132/82  96 Room Air  
 
 1459 98.7 99 18 134/80  94 Room Air  
 
 
 Intake & Output
 
 
  0800  0000  1600
 
Intake Total 240  770
 
Output Total   
 
Balance 240  770
 
    
 
Intake, IV   150
 
Intake, Oral 240  620
 
Number   0
 
Bowel   
 
Movements   
 
 
 
 
Physical Exam
General Appearance: Alert, Oriented X3, Cooperative
Skin: No Rashes, Improving hematoma on left forearm, no tenderness, swelling, 
exudate or warmth appreciated. 
HEENT: Atraumatic, EOMI
Neck: Supple, No LAD
Cardiovascular: Regular Rate, Normal S1, Normal S2
Lungs: Clear to Auscultation, Normal Air Movement
Neurological: Normal Speech, Strength at 5/5 X4 Ext, Sensation Intact
 
Assessment/Plan
Assessment:
Patient is a 58 yo F with a PMH significant for hypertension, anemia, chronic 
back pain, fibromyalgia, degenerative joint disease, rheumatoid arthritis, 
hypothyroidism, endometriosis, hiatal hernia. She presented to ED on 7/15/18 
with a cat bite on her left forearm which lead to swelling and cellulitis of the
affected arm. 
 
 
Problem List: 
1. Cat Bite
2. Anemia
3. Fibromyalgia 
4. Rheumatoid Arthritis 
5. Hypothyroidism 
 
 
#Cat Bite: 
Plan: 
- Completed Unasyn for 2 days, Will discharge with PO Augmentin for 5 days
- f/u blood cultures 
- 2nd Rabies shot on 18, will come into ED on 18
 
#Hypothyroidism
Plan: 
Continue Levothyroxine 0.088mg
 
#Anemia: H/H 9.1/29.1. Low Iron and Ferritin 
plan: 
-Ferrous Sulfate 325mg 
 
#Rheumatoid Arthritis
Plan: 
- Continue Prednisone 20mg
 
#Fibromyalgia 
Plan: 
- Duloxetine 20mg 
- Gabapentin 800mg TID 
 
 
Will discharge patient home today with Augmentin 875/125mg for 5 days to 
complete 7 day course, and Ferrous Sulfate 325mg. Discusses with patient that 
she will have to follow up with PCP in 1 week to reassess further need of ABx or
if symptoms worsen. She also needs PCP to re-evaluate elevated HbA1c, patient is
currently infected, history of prednisone use, w ill provide Dietary 
instructions
Problem List:
 1. Cat bite
 
Pain Ratin
Pain Location:
n/a
Pain Goal: Remain pain free
Pain Plan:
tylenol 
Tomorrow's Labs & Rationales:
n/a

## 2018-07-17 NOTE — PATIENT DISCHARGE INSTRUCTIONS
Discharge Instructions
 
General Discharge Information
You were seen/treated for:
Cellulitis secondary cat bite
Watch for these problems:
Fever, swelling, discharge 
Special Instructions:
Please complete course of Antibiotics. Follow up with PCP in 1 week for bite, 
and elevated HbA1C. Come to ED on 7/18/18 for Rabies Vaccine shot 2/2
 
Diet
Continue normal diet: Yes
Recommended Diet: Low Residue
 
Activity
Full Activity/No Limits: Yes
 
Acute Coronary Syndrome
 
Inclusion Criteria
At DC or during hospital stay patient has or had the following:
ACS DIAGNOSIS No
 
Discharge Core Measures
Meds if any: Prescribed or Continued at Discharge
Meds if any: NOT Prescribed or Continued at Discharge
 
Congestive Heart Failure
 
Inclusion Criteria
At DC or during hospital stay patient has or had the following:
CHF DIAGNOSIS No
 
Discharge Core Measures
Meds if any: Prescribed or Continued at Discharge
Meds if any: NOT Prescribed or Continued at Discharge
 
Cerebrovascular accident
 
Inclusion Criteria
At DC or during hospital stay patient has or had the following:
CVA/TIA Diagnosis No
 
Discharge Core Measures
Meds if any: Prescribed or Continued at Discharge
Meds if any: NOT Prescribed or Continued at Discharge
 
Venous thromboembolism
 
Inclusion Criteria
VTE Diagnosis No
VTE Type NONE
VTE Confirmed by (Test) NONE
 
Discharge Core Measures
- Per Current guidelines, there needs to be overlap
- treatment for the first 5 days of Warfarin therapy.
- If discharged on Warfarin prior to 5 days of
- overlap therapy, the patient will need to be
- assessed for post discharge needs including
- *Post discharge parental anticoagulation
- *Warfarin and/or parental anticoagulation education
- *Follow up date to check INR post discharge
At least 5 days overlap therapy as Inpatient No
Meds if any: Prescribed or Continued at Discharge
Note: Overlap Therapy is Warfarin and Anticoagulant
Meds if any: NOT Prescribed or Continued at Discharge

## 2018-07-17 NOTE — DISCHARGE SUMMARY
Visit Information
 
Visit Dates
Admission Date:
07/15/18
 
Discharge Date:
18
 
Hospital Course
 
Course
Attending Physician:
Jabari Clark MD
 
Primary Care Physician:
Deanna SALDIVAR,Aroldo MONTOYA
 
Hospital Course:
Patient is a 58 yo F with a PMH significant for hypertension, anemia, chronic 
back pain, fibromyalgia, degenerative joint disease, rheumatoid arthritis, 
hypothyroidism, endometriosis, hiatal hernia. She presented to ED on 7/15/18 
with a cat bite on her left forearm which lead to swelling and cellulitis of the
affected arm. 
 
 
ED vitals:
Temperature 98.3, heart rate 104 respiratory rate 18, blood pressure 154/96, O2 
sat 98% on room air.
 
Significant ED labs: H&H 9.1/29.1, platelet count 490, absolute granulocyte 8.2,
hemoglobin A1c 7.6, iron 98, ferritin 9.8.
 
CT imagin. Upper extremity CTIMPRESSION:
Diffuse subcutaneous edema along the volar/ulnar aspect of the left forearm
which may represent cellulitis. No abscess. No underlying osseous abnormality
or radiopaque foreign body.
 
Problem List: 
1. Ceelulitis secondary to cat bite
2. Anemia
3. Fibromyalgia 
4. Rheumatoid Arthritis 
5. Hypothyroidism 
 
ED course:
Patient was given one-time dose clindamycin, given tetanus Tdap vaccine, rabies 
vaccine 1/2, started on Unasyn and admitted to general medicine.
 
GM course:
Home medications were continued for chronic conditions. Patient was continued on
Unasyn, her swelling improved, hematoma decreased in size. No signs of spreading
infection.  Patient was discharged on 2018, was discharged home with 
Augmentin for 5 days to complete a seven-day course.  Patient is to follow-up 
with PCP in 1 week to reassess infection, she is instructed to come back to 
emergency department on 2018 for second rabies shot.
Allergies:
Uncoded Allergies:
METAL (Mild, RASH 16)
SOAPS AND LOTIONS (Mild, RASH 16)
 
 
Disposition Summary
 
Disposition
Principal Diagnosis:
Cat Bite
Additional Diagnosis:
 Anemia
 Fibromyalgia 
 Rheumatoid Arthritis 
Hypothyroidism 
Discharge Disposition: home or self care
 
Discharge Instructions
 
General Discharge Information
Code Status: Full Code
Patient's Diet:
Low Carbohydrate
Patient's Activity:
As tolerated
Follow-Up Instructions/Appts:
Come to ED on 18 for 2nd Rabies Shot. F/u with PCP in 1 week to reassess 
need for ABx. 
 
Medications at Discharge
Discharge Medications:
Continue taking these medications:
Gabapentin (Neurontin) 800 MG TABLET
    1 Tablet ORAL THREE TIMES DAILY
    Qty = 90
    Comments:
       Last Taken: 18
             Time: 0500
 
Meloxicam (Mobic) 15 MG TABLET
    1 Tablet ORAL DAILY as needed for pain
    Qty = 15
    Comments:
       NOT GIVEN
 
Prednisone (Prednisone) 20 MG TABLET
    1 Tablet ORAL DAILY
    Comments:
       Last Taken: 18
             Time: 0800
 
Etanercept (Enbrel) 50 MG/ML (0.98 ML) PEN.INJCTR
    50 Milligram SC EVERY FRIDAY
    Qty = 392
    Comments:
       NOT GIVEN
 
Duloxetine HCl (Duloxetine HCl) 20 MG CAPSULE.DR
    1 Capsule ORAL DAILY
    Qty = 30
    Comments:
       Last Taken: 18
             Time: 0800
 
Fentanyl (Duragesic) 25 MCG/HOUR PATCH.TD72
    1 Patch On the skin Every 3 days
    Comments:
       NOT GIVEN
 
Levothyroxine Sodium (Levothyroxine Sodium) 88 MCG TABLET
    1 Tablet ORAL DAILY
    Qty = 30
    Comments:
       Last Taken: 18
             Time: 0800
 
Cyclobenzaprine HCl (Cyclobenzaprine HCl) (Unknown Strength) TABLET
    Unknown Dose ORAL THREE TIMES DAILY
    Qty = 90
    Comments:
       Last Taken: 18
             Time: 0829
 
Start taking the following new medications:
Amoxicillin/Potassium Clav (Augmentin 875-125 Tablet) 875 MG-125 MG TABLET
    1 Tablet ORAL TWICE DAILY
    Qty = 10
    No Refills
    Instructions:
       .
    Comments:
       NOT GIVEN
 
Ferrous Sulfate (Ferrous Sulfate) 325 MG (65 MG IRON) TABLET.
    325 Milligram ORAL TWICE DAILY
    Qty = 30
    No Refills
    Instructions:
       .
    Comments:
       Last Taken: 18
             Time: 0800
 
 
Copies To:
Deanna SALDIVAR,Aroldo MONTOYA
 
Attending MD Review Statement
Documenting Attending:
Jabari Clark MD
Other Findings:
The patient was seen and agree with the plan of care as outlined. Will complete 
po Augmentin course at home. Follow-up with PCP.